# Patient Record
Sex: MALE | URBAN - METROPOLITAN AREA
[De-identification: names, ages, dates, MRNs, and addresses within clinical notes are randomized per-mention and may not be internally consistent; named-entity substitution may affect disease eponyms.]

---

## 2022-11-09 ENCOUNTER — OFFICE VISIT (OUTPATIENT)
Dept: URBAN - METROPOLITAN AREA CLINIC 22 | Facility: CLINIC | Age: 52
End: 2022-11-09
Payer: COMMERCIAL

## 2022-11-09 DIAGNOSIS — H11.013 AMYLOID PTERYGIUM OF EYE, BILATERAL: Primary | ICD-10-CM

## 2022-11-09 DIAGNOSIS — H52.4 PRESBYOPIA: ICD-10-CM

## 2022-11-09 PROCEDURE — 92004 COMPRE OPH EXAM NEW PT 1/>: CPT | Performed by: STUDENT IN AN ORGANIZED HEALTH CARE EDUCATION/TRAINING PROGRAM

## 2022-11-09 ASSESSMENT — INTRAOCULAR PRESSURE
OD: 17
OS: 15

## 2022-11-09 ASSESSMENT — VISUAL ACUITY
OD: 20/25
OS: 20/25

## 2022-11-09 NOTE — IMPRESSION/PLAN
Impression: Amyloid pterygium of eye, bilateral: H11.013. Plan: Discussed findings. Rx artificial tears QID OU, lubricating artur QHS OU, and UV protection outdoors. Contact clinic if symptoms persist or worsen.